# Patient Record
Sex: MALE | Race: WHITE | URBAN - METROPOLITAN AREA
[De-identification: names, ages, dates, MRNs, and addresses within clinical notes are randomized per-mention and may not be internally consistent; named-entity substitution may affect disease eponyms.]

---

## 2017-07-27 ENCOUNTER — HISTORICAL (OUTPATIENT)
Dept: ADMINISTRATIVE | Facility: HOSPITAL | Age: 67
End: 2017-07-27

## 2017-07-31 ENCOUNTER — HISTORICAL (OUTPATIENT)
Dept: LAB | Facility: HOSPITAL | Age: 67
End: 2017-07-31

## 2017-07-31 LAB
ABS NEUT (OLG): 5.35 X10(3)/MCL (ref 2.1–9.2)
ALBUMIN SERPL-MCNC: 4.1 GM/DL (ref 3.4–5)
ALBUMIN/GLOB SERPL: 1.6 {RATIO}
ALP SERPL-CCNC: 98 UNIT/L (ref 50–136)
ALT SERPL-CCNC: 28 UNIT/L (ref 12–78)
APPEARANCE, UA: CLEAR
APTT PPP: 26.9 SECOND(S) (ref 20.6–36)
AST SERPL-CCNC: 15 UNIT/L (ref 15–37)
BACTERIA SPEC CULT: NORMAL /HPF
BASOPHILS # BLD AUTO: 0 X10(3)/MCL (ref 0–0.2)
BASOPHILS NFR BLD AUTO: 0 %
BILIRUB SERPL-MCNC: 0.5 MG/DL (ref 0.2–1)
BILIRUB UR QL STRIP: NEGATIVE
BILIRUBIN DIRECT+TOT PNL SERPL-MCNC: 0.2 MG/DL (ref 0–0.2)
BILIRUBIN DIRECT+TOT PNL SERPL-MCNC: 0.3 MG/DL (ref 0–0.8)
BUN SERPL-MCNC: 17 MG/DL (ref 7–18)
CALCIUM SERPL-MCNC: 9.6 MG/DL (ref 8.5–10.1)
CHLORIDE SERPL-SCNC: 108 MMOL/L (ref 98–107)
CO2 SERPL-SCNC: 27 MMOL/L (ref 21–32)
COLOR UR: YELLOW
CREAT SERPL-MCNC: 1.12 MG/DL (ref 0.7–1.3)
EOSINOPHIL # BLD AUTO: 0.1 X10(3)/MCL (ref 0–0.9)
EOSINOPHIL NFR BLD AUTO: 1 %
ERYTHROCYTE [DISTWIDTH] IN BLOOD BY AUTOMATED COUNT: 12.8 % (ref 11.5–17)
GLOBULIN SER-MCNC: 2.6 GM/DL (ref 2.4–3.5)
GLUCOSE (UA): NEGATIVE
GLUCOSE SERPL-MCNC: 99 MG/DL (ref 74–106)
HCT VFR BLD AUTO: 41.6 % (ref 42–52)
HGB BLD-MCNC: 13.9 GM/DL (ref 14–18)
HGB UR QL STRIP: NEGATIVE
INR PPP: 0.98 (ref 0–1.27)
KETONES UR QL STRIP: NEGATIVE
LEUKOCYTE ESTERASE UR QL STRIP: NEGATIVE
LYMPHOCYTES # BLD AUTO: 2.1 X10(3)/MCL (ref 0.6–4.6)
LYMPHOCYTES NFR BLD AUTO: 25 %
MCH RBC QN AUTO: 31.1 PG (ref 27–31)
MCHC RBC AUTO-ENTMCNC: 33.4 GM/DL (ref 33–36)
MCV RBC AUTO: 93.1 FL (ref 80–94)
MONOCYTES # BLD AUTO: 0.6 X10(3)/MCL (ref 0.1–1.3)
MONOCYTES NFR BLD AUTO: 7 %
NEUTROPHILS # BLD AUTO: 5.35 X10(3)/MCL (ref 2.1–9.2)
NEUTROPHILS NFR BLD AUTO: 66 %
NITRITE UR QL STRIP: NEGATIVE
PH UR STRIP: 5.5 [PH] (ref 5–9)
PLATELET # BLD AUTO: 248 X10(3)/MCL (ref 130–400)
PMV BLD AUTO: 10.9 FL (ref 9.4–12.4)
POTASSIUM SERPL-SCNC: 5.6 MMOL/L (ref 3.5–5.1)
PROT SERPL-MCNC: 6.7 GM/DL (ref 6.4–8.2)
PROT UR QL STRIP: NEGATIVE
PROTHROMBIN TIME: 12.8 SECOND(S) (ref 12.1–14.2)
RBC # BLD AUTO: 4.47 X10(6)/MCL (ref 4.7–6.1)
RBC #/AREA URNS HPF: NORMAL /[HPF]
SODIUM SERPL-SCNC: 142 MMOL/L (ref 136–145)
SP GR UR STRIP: 1.02 (ref 1–1.03)
SQUAMOUS EPITHELIAL, UA: NORMAL
TRANSFERRIN SERPL-MCNC: 231 MG/DL (ref 200–360)
UROBILINOGEN UR STRIP-ACNC: 0.2
WBC # SPEC AUTO: 8.1 X10(3)/MCL (ref 4.5–11.5)
WBC #/AREA URNS HPF: NORMAL /HPF

## 2017-09-12 ENCOUNTER — HISTORICAL (OUTPATIENT)
Dept: ADMINISTRATIVE | Facility: HOSPITAL | Age: 67
End: 2017-09-12

## 2017-11-30 ENCOUNTER — HISTORICAL (OUTPATIENT)
Dept: ADMINISTRATIVE | Facility: HOSPITAL | Age: 67
End: 2017-11-30

## 2018-10-26 ENCOUNTER — HISTORICAL (OUTPATIENT)
Dept: LAB | Facility: HOSPITAL | Age: 68
End: 2018-10-26

## 2018-10-26 ENCOUNTER — HISTORICAL (OUTPATIENT)
Dept: PREADMISSION TESTING | Facility: HOSPITAL | Age: 68
End: 2018-10-26

## 2018-10-26 LAB
ABS NEUT (OLG): 3.13 X10(3)/MCL (ref 2.1–9.2)
BASOPHILS # BLD AUTO: 0 X10(3)/MCL (ref 0–0.2)
BASOPHILS NFR BLD AUTO: 1 %
BUN SERPL-MCNC: 18 MG/DL (ref 7–18)
CALCIUM SERPL-MCNC: 9.1 MG/DL (ref 8.5–10.1)
CHLORIDE SERPL-SCNC: 109 MMOL/L (ref 98–107)
CO2 SERPL-SCNC: 27 MMOL/L (ref 21–32)
CREAT SERPL-MCNC: 1.32 MG/DL (ref 0.7–1.3)
CREAT/UREA NIT SERPL: 13.6
EOSINOPHIL # BLD AUTO: 0.1 X10(3)/MCL (ref 0–0.9)
EOSINOPHIL NFR BLD AUTO: 2 %
ERYTHROCYTE [DISTWIDTH] IN BLOOD BY AUTOMATED COUNT: 12.4 % (ref 11.5–17)
GLUCOSE SERPL-MCNC: 79 MG/DL (ref 74–106)
HCT VFR BLD AUTO: 42.9 % (ref 42–52)
HGB BLD-MCNC: 14 GM/DL (ref 14–18)
LYMPHOCYTES # BLD AUTO: 2 X10(3)/MCL (ref 0.6–4.6)
LYMPHOCYTES NFR BLD AUTO: 34 %
MCH RBC QN AUTO: 30.9 PG (ref 27–31)
MCHC RBC AUTO-ENTMCNC: 32.6 GM/DL (ref 33–36)
MCV RBC AUTO: 94.7 FL (ref 80–94)
MONOCYTES # BLD AUTO: 0.6 X10(3)/MCL (ref 0.1–1.3)
MONOCYTES NFR BLD AUTO: 10 %
NEUTROPHILS # BLD AUTO: 3.13 X10(3)/MCL (ref 2.1–9.2)
NEUTROPHILS NFR BLD AUTO: 54 %
PLATELET # BLD AUTO: 212 X10(3)/MCL (ref 130–400)
PMV BLD AUTO: 10.9 FL (ref 9.4–12.4)
POTASSIUM SERPL-SCNC: 5.1 MMOL/L (ref 3.5–5.1)
RBC # BLD AUTO: 4.53 X10(6)/MCL (ref 4.7–6.1)
SODIUM SERPL-SCNC: 144 MMOL/L (ref 136–145)
WBC # SPEC AUTO: 5.8 X10(3)/MCL (ref 4.5–11.5)

## 2018-10-31 ENCOUNTER — HISTORICAL (OUTPATIENT)
Dept: SURGERY | Facility: HOSPITAL | Age: 68
End: 2018-10-31

## 2022-04-11 ENCOUNTER — HISTORICAL (OUTPATIENT)
Dept: ADMINISTRATIVE | Facility: HOSPITAL | Age: 72
End: 2022-04-11

## 2022-04-28 VITALS
BODY MASS INDEX: 23.1 KG/M2 | HEIGHT: 71 IN | WEIGHT: 165 LBS | SYSTOLIC BLOOD PRESSURE: 132 MMHG | DIASTOLIC BLOOD PRESSURE: 86 MMHG

## 2022-04-29 NOTE — OP NOTE
Patient:   German Agarwal            MRN: [id number]            FIN: [id number]               Age:   68 years     Sex:  Male     :  1950   Associated Diagnoses:   Left inguinal hernia   Author:   Maximo Manzano MD      Operative Note   Operative Information   Date/ Time:  10/31/2018 08:28:00.     Procedures Performed: Procedure Code   No active encounter procedure items have been selected or recorded., TEPP.     Preoperative Diagnosis: Left inguinal hernia (KJP26-HV K40.90).     Postoperative Diagnosis: Left inguinal hernia (TZY53-PT K40.90).     Surgeon: Maximo Manzano MD.     Assistant: Temitope Wills.     Anesthesia: GETA.     Description of Procedure/Findings/    Complications: After informed consent, the patient was brought to the OR.  He was placed on the table in the supine position, and given general anesthesia.    The abdomen was prepped and draped.  The bladder was emptied.  An infraumbilicial incision was made into the preperitoneal space.  A peritoneal dissection balloon was placed and insufflated under direct vision.  The SBT replaced this device and pneumopreperitoneum was established.  Additional working ports placed under direct vision.  He had a indirect hernia.  The contents were reduced.  Appropriate dissection revealed the appropriate structures (Ileopubic tract, Iliac Vessels, Cord structures, Jason's ligament, Inferior Epigastric Vessels) There was no injury to any of these structures.  Large left 3D Max mesh placed up against the myopectineal orifice.  This completed the repair.     There was no right sided hernia.  Trocars removed.  Incisions closed with 0 and 4-0 Vicryl.  The patient was allowed to emerge from anesthetic and brought to recovery room.  .     Esimated blood loss: loss less than  25  cc.     Complications.

## 2024-03-28 ENCOUNTER — LAB REQUISITION (OUTPATIENT)
Dept: LAB | Facility: HOSPITAL | Age: 74
End: 2024-03-28
Payer: COMMERCIAL

## 2024-03-28 DIAGNOSIS — H16.002 UNSPECIFIED CORNEAL ULCER, LEFT EYE: ICD-10-CM

## 2024-03-28 LAB — KOH PREP SPEC: NORMAL

## 2024-03-28 PROCEDURE — 87220 TISSUE EXAM FOR FUNGI: CPT | Performed by: OPHTHALMOLOGY

## 2024-03-28 PROCEDURE — 87107 FUNGI IDENTIFICATION MOLD: CPT | Performed by: OPHTHALMOLOGY

## 2024-03-28 PROCEDURE — 87070 CULTURE OTHR SPECIMN AEROBIC: CPT | Performed by: OPHTHALMOLOGY

## 2024-03-28 PROCEDURE — 87102 FUNGUS ISOLATION CULTURE: CPT | Performed by: OPHTHALMOLOGY

## 2024-04-19 LAB — MAYO GENERIC ORDERABLE RESULT: ABNORMAL

## 2024-04-22 LAB
FUNGUS SPEC CULT: ABNORMAL
FUNGUS SPEC CULT: ABNORMAL

## 2024-04-27 LAB — BACTERIA EYE AEROBE CULT: ABNORMAL
